# Patient Record
Sex: MALE | Race: WHITE | NOT HISPANIC OR LATINO | Employment: OTHER | URBAN - METROPOLITAN AREA
[De-identification: names, ages, dates, MRNs, and addresses within clinical notes are randomized per-mention and may not be internally consistent; named-entity substitution may affect disease eponyms.]

---

## 2023-10-27 DIAGNOSIS — G43.009 MIGRAINE WITHOUT AURA AND WITHOUT STATUS MIGRAINOSUS, NOT INTRACTABLE: Primary | ICD-10-CM

## 2023-10-27 PROBLEM — G43.909 MIGRAINE: Status: ACTIVE | Noted: 2023-10-27

## 2023-10-27 RX ORDER — SUMATRIPTAN SUCCINATE 100 MG/1
100 TABLET ORAL AS NEEDED
COMMUNITY
End: 2023-11-02 | Stop reason: SDUPTHER

## 2023-10-27 RX ORDER — NORTRIPTYLINE HYDROCHLORIDE 10 MG/1
40 CAPSULE ORAL NIGHTLY
COMMUNITY
End: 2023-10-27 | Stop reason: SDUPTHER

## 2023-10-27 RX ORDER — NORTRIPTYLINE HYDROCHLORIDE 10 MG/1
40 CAPSULE ORAL NIGHTLY
Qty: 30 CAPSULE | Refills: 0 | Status: SHIPPED | OUTPATIENT
Start: 2023-10-27 | End: 2023-11-15 | Stop reason: SDUPTHER

## 2023-10-28 PROBLEM — M75.00 FROZEN SHOULDER SYNDROME: Status: ACTIVE | Noted: 2023-10-28

## 2023-10-28 PROBLEM — R11.10 VOMITING: Status: ACTIVE | Noted: 2023-10-28

## 2023-10-28 PROBLEM — K27.9 PEPTIC ULCER DISEASE: Status: ACTIVE | Noted: 2023-10-28

## 2023-10-28 PROBLEM — R51.9 HEADACHE: Status: ACTIVE | Noted: 2023-10-28

## 2023-10-28 PROBLEM — K52.9 CHRONIC DIARRHEA OF UNKNOWN ORIGIN: Status: ACTIVE | Noted: 2023-10-28

## 2023-10-28 PROBLEM — F17.200 NICOTINE DEPENDENCE: Status: ACTIVE | Noted: 2023-10-28

## 2023-10-28 PROBLEM — K31.1: Status: ACTIVE | Noted: 2023-10-28

## 2023-10-28 PROBLEM — K63.89 BACTERIAL OVERGROWTH SYNDROME: Status: ACTIVE | Noted: 2023-10-28

## 2023-10-28 RX ORDER — HYDROCODONE BITARTRATE AND ACETAMINOPHEN 5; 325 MG/1; MG/1
1 TABLET ORAL EVERY 6 HOURS PRN
COMMUNITY
Start: 2023-09-25 | End: 2023-10-30 | Stop reason: ALTCHOICE

## 2023-10-28 RX ORDER — CIPROFLOXACIN 250 MG/1
250 TABLET, FILM COATED ORAL EVERY 12 HOURS
COMMUNITY
End: 2023-10-30 | Stop reason: SDUPTHER

## 2023-10-28 RX ORDER — OMEPRAZOLE 40 MG/1
40 CAPSULE, DELAYED RELEASE ORAL 2 TIMES DAILY
COMMUNITY
End: 2023-10-30 | Stop reason: DRUGHIGH

## 2023-10-28 RX ORDER — FERROUS SULFATE 325(65) MG
TABLET ORAL
COMMUNITY
Start: 2021-05-30

## 2023-10-28 RX ORDER — DIHYDROERGOTAMINE MESYLATE 4 MG/ML
SPRAY, METERED NASAL
COMMUNITY
Start: 2022-01-25 | End: 2023-10-30 | Stop reason: ALTCHOICE

## 2023-10-28 RX ORDER — LEVOFLOXACIN 250 MG/1
1 TABLET ORAL DAILY
COMMUNITY
Start: 2021-12-08

## 2023-10-28 RX ORDER — PANCRELIPASE 60000; 12000; 38000 [USP'U]/1; [USP'U]/1; [USP'U]/1
3 CAPSULE, DELAYED RELEASE PELLETS ORAL 3 TIMES DAILY
COMMUNITY

## 2023-10-28 RX ORDER — CEPHALEXIN 250 MG/1
250 CAPSULE ORAL 2 TIMES DAILY
COMMUNITY
End: 2023-10-30 | Stop reason: SDUPTHER

## 2023-10-28 RX ORDER — METRONIDAZOLE 250 MG/1
1 TABLET ORAL 3 TIMES DAILY
COMMUNITY
Start: 2018-07-24 | End: 2023-10-30 | Stop reason: ALTCHOICE

## 2023-10-30 ENCOUNTER — OFFICE VISIT (OUTPATIENT)
Dept: GASTROENTEROLOGY | Facility: CLINIC | Age: 53
End: 2023-10-30
Payer: COMMERCIAL

## 2023-10-30 VITALS
TEMPERATURE: 98.2 F | HEART RATE: 81 BPM | WEIGHT: 178 LBS | HEIGHT: 70 IN | BODY MASS INDEX: 25.48 KG/M2 | SYSTOLIC BLOOD PRESSURE: 136 MMHG | DIASTOLIC BLOOD PRESSURE: 90 MMHG

## 2023-10-30 DIAGNOSIS — K63.89 BACTERIAL OVERGROWTH SYNDROME: Primary | ICD-10-CM

## 2023-10-30 DIAGNOSIS — K27.9 PEPTIC ULCER DISEASE: ICD-10-CM

## 2023-10-30 PROCEDURE — 1036F TOBACCO NON-USER: CPT | Performed by: INTERNAL MEDICINE

## 2023-10-30 PROCEDURE — 99213 OFFICE O/P EST LOW 20 MIN: CPT | Performed by: INTERNAL MEDICINE

## 2023-10-30 RX ORDER — CEPHALEXIN 250 MG/1
250 CAPSULE ORAL 2 TIMES DAILY
Qty: 30 CAPSULE | Refills: 3 | Status: SHIPPED | OUTPATIENT
Start: 2023-10-30

## 2023-10-30 RX ORDER — CIPROFLOXACIN 250 MG/1
250 TABLET, FILM COATED ORAL EVERY 12 HOURS
Qty: 30 TABLET | Refills: 3 | Status: SHIPPED | OUTPATIENT
Start: 2023-10-30

## 2023-10-30 RX ORDER — OMEPRAZOLE 40 MG/1
40 CAPSULE, DELAYED RELEASE ORAL
Qty: 30 CAPSULE | Refills: 11 | Status: SHIPPED | OUTPATIENT
Start: 2023-10-30

## 2023-10-30 ASSESSMENT — ENCOUNTER SYMPTOMS
ABDOMINAL PAIN: 0
HEMATOLOGIC/LYMPHATIC NEGATIVE: 1
MUSCULOSKELETAL NEGATIVE: 1
ALLERGIC/IMMUNOLOGIC NEGATIVE: 1
BLOOD IN STOOL: 0
NEUROLOGICAL NEGATIVE: 1
VOMITING: 1
EYES NEGATIVE: 1
NAUSEA: 1
RESPIRATORY NEGATIVE: 1
CARDIOVASCULAR NEGATIVE: 1
CONSTITUTIONAL NEGATIVE: 1
CONSTIPATION: 0
PSYCHIATRIC NEGATIVE: 1
ENDOCRINE NEGATIVE: 1
DIARRHEA: 1

## 2023-10-30 NOTE — PROGRESS NOTES
"Subjective   Patient ID: Michael Maier is a 53 y.o. male who presents for Follow-up (GI issues).    Patient reports that he is feeling much better and endorses a 5-10lb weight gain. Endorses 1 episode of nausea/vomiting per month but only occurs when he does not stick to his scheduled eating schedule. Usually eats multiple small meals per day. Denies any episodes of diarrhea with the antibiotics but has been out of his prescription for 1 month and has had 2-3 episodes of diarrhea with this. While on the antibiotics, has 1 BM every 3 days that is soft, solid and without blood. Denies any abdominal pain. Reports a sensation of a lump in his throat/tightness but the burning sensation with food has resolved. He would like to decrease his omeprazole.    Review of Systems   Constitutional: Negative.    HENT: Negative.     Eyes: Negative.    Respiratory: Negative.     Cardiovascular: Negative.    Gastrointestinal:  Positive for diarrhea, nausea and vomiting. Negative for abdominal pain, blood in stool and constipation.   Endocrine: Negative.    Genitourinary: Negative.    Musculoskeletal: Negative.    Allergic/Immunologic: Negative.    Neurological: Negative.    Hematological: Negative.    Psychiatric/Behavioral: Negative.            Objective   Visit Vitals  /90 (BP Location: Left arm, Patient Position: Sitting, BP Cuff Size: Adult)   Pulse 81   Temp 36.8 °C (98.2 °F)   Ht 1.778 m (5' 10\")   Wt 80.7 kg (178 lb)   BMI 25.54 kg/m²   Smoking Status Former   BSA 2 m²     Physical Exam  Constitutional:       Appearance: Normal appearance.   HENT:      Head: Normocephalic and atraumatic.      Mouth/Throat:      Mouth: Mucous membranes are moist.   Eyes:      Extraocular Movements: Extraocular movements intact.      Conjunctiva/sclera: Conjunctivae normal.      Pupils: Pupils are equal, round, and reactive to light.   Cardiovascular:      Rate and Rhythm: Normal rate and regular rhythm.      Heart sounds: Normal " heart sounds.   Pulmonary:      Effort: Pulmonary effort is normal.      Breath sounds: Normal breath sounds.   Abdominal:      General: Abdomen is flat. Bowel sounds are normal.      Palpations: Abdomen is soft.      Tenderness: There is no abdominal tenderness.   Musculoskeletal:         General: Normal range of motion.      Cervical back: Normal range of motion and neck supple.   Skin:     General: Skin is warm and dry.   Neurological:      General: No focal deficit present.      Mental Status: He is alert and oriented to person, place, and time.   Psychiatric:         Mood and Affect: Mood normal.         Radiology: Reviewed imaging in powerchart.  No results found.      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    #Bacterial Overgrowth Syndrome   #Blind Loop Syndrome s/p Whipple  -continue the cephalexin/ciprofloxacin alternating course (continuously for 2 weeks each)    #GERD  -decrease the omeprazole 40mg BID to once daily    #Health Maintenance  -up to date on his colonoscopy- last performed 06/27/2022, repeat in 10 years    Staffed with the attending physician Dr. Abreu.      Priyanka Mejía, PGY-1  [facility]/ Internal Medicine    I saw and evaluated the patient. I personally obtained the key and critical portions of the history and physical exam or was physically present for key and critical portions performed by the trainee. I agree with the trainee's medical decision making as described in the  trainee's note.    Gurpreet Abreu MD

## 2023-10-30 NOTE — PATIENT INSTRUCTIONS
You are doing really well. I refilled your antibiotics. You can cut down to once a day on the omeprazole. Continue with Creon. Follow up in 6 months.

## 2023-11-02 DIAGNOSIS — G43.009 MIGRAINE WITHOUT AURA AND WITHOUT STATUS MIGRAINOSUS, NOT INTRACTABLE: Primary | ICD-10-CM

## 2023-11-02 RX ORDER — SUMATRIPTAN SUCCINATE 100 MG/1
100 TABLET ORAL AS NEEDED
Qty: 9 TABLET | Refills: 1 | Status: SHIPPED | OUTPATIENT
Start: 2023-11-02 | End: 2023-11-15 | Stop reason: SDUPTHER

## 2023-11-15 ENCOUNTER — TELEMEDICINE (OUTPATIENT)
Dept: NEUROLOGY | Facility: CLINIC | Age: 53
End: 2023-11-15
Payer: COMMERCIAL

## 2023-11-15 DIAGNOSIS — G43.009 MIGRAINE WITHOUT AURA AND WITHOUT STATUS MIGRAINOSUS, NOT INTRACTABLE: Primary | ICD-10-CM

## 2023-11-15 PROCEDURE — 99214 OFFICE O/P EST MOD 30 MIN: CPT | Performed by: NURSE PRACTITIONER

## 2023-11-15 RX ORDER — NORTRIPTYLINE HYDROCHLORIDE 10 MG/1
40 CAPSULE ORAL NIGHTLY
Qty: 120 CAPSULE | Refills: 5 | Status: SHIPPED | OUTPATIENT
Start: 2023-11-15 | End: 2024-05-10 | Stop reason: SDUPTHER

## 2023-11-15 RX ORDER — SUMATRIPTAN SUCCINATE 100 MG/1
100 TABLET ORAL AS NEEDED
Qty: 9 TABLET | Refills: 5 | Status: SHIPPED | OUTPATIENT
Start: 2023-11-15 | End: 2024-05-22 | Stop reason: SDUPTHER

## 2023-11-15 NOTE — PROGRESS NOTES
Patient being assessed today for follow-up of migraine.  He reports that since adjusting the nortriptyline dose that he has had significant improvement in the duration of his migraines.  They were lasting 3 to 4 days prior to the change and now they are back down to normal 1 or 2.  He does utilize the sumatriptan as necessary for breakthrough migraines.  This is effective for him.  Denies any side effects.  Would like to continue the nortriptyline and sumatriptan as he has been taking them.  Discussed role of medicine, importance of taking medications, potential risks, benefits, and precautions to be taken.  Reviewed sleep hygiene and dietary modifications.  Follow-up in 6 months.    This note was created with voice recognition software and was not corrected for typographical or grammatical errors

## 2024-01-31 ENCOUNTER — TELEPHONE (OUTPATIENT)
Dept: SURGERY | Facility: CLINIC | Age: 54
End: 2024-01-31
Payer: COMMERCIAL

## 2024-04-29 ENCOUNTER — APPOINTMENT (OUTPATIENT)
Dept: GASTROENTEROLOGY | Facility: CLINIC | Age: 54
End: 2024-04-29
Payer: COMMERCIAL

## 2024-05-10 DIAGNOSIS — G43.009 MIGRAINE WITHOUT AURA AND WITHOUT STATUS MIGRAINOSUS, NOT INTRACTABLE: ICD-10-CM

## 2024-05-10 RX ORDER — NORTRIPTYLINE HYDROCHLORIDE 10 MG/1
40 CAPSULE ORAL NIGHTLY
Qty: 30 CAPSULE | Refills: 0 | Status: SHIPPED | OUTPATIENT
Start: 2024-05-10 | End: 2024-05-22 | Stop reason: SDUPTHER

## 2024-05-22 ENCOUNTER — TELEMEDICINE (OUTPATIENT)
Dept: NEUROLOGY | Facility: CLINIC | Age: 54
End: 2024-05-22
Payer: COMMERCIAL

## 2024-05-22 DIAGNOSIS — G43.009 MIGRAINE WITHOUT AURA AND WITHOUT STATUS MIGRAINOSUS, NOT INTRACTABLE: Primary | ICD-10-CM

## 2024-05-22 PROCEDURE — 99214 OFFICE O/P EST MOD 30 MIN: CPT | Performed by: NURSE PRACTITIONER

## 2024-05-22 RX ORDER — SUMATRIPTAN SUCCINATE 100 MG/1
100 TABLET ORAL AS NEEDED
Qty: 9 TABLET | Refills: 5 | Status: SHIPPED | OUTPATIENT
Start: 2024-05-22

## 2024-05-22 RX ORDER — NORTRIPTYLINE HYDROCHLORIDE 10 MG/1
40 CAPSULE ORAL NIGHTLY
Qty: 120 CAPSULE | Refills: 11 | Status: SHIPPED | OUTPATIENT
Start: 2024-05-22

## 2024-05-22 NOTE — PROGRESS NOTES
Airway    Date/Time: 11/3/2022 1:19 PM  Performed by: Yohana Monique M.D.  Authorized by: Yohana Monique M.D.     Location:  OR  Urgency:  Elective  Indications for Airway Management:  Anesthesia      Spontaneous Ventilation: absent    Sedation Level:  Deep  Preoxygenated: Yes    Mask Difficulty Assessment:  1 - vent by mask  Final Airway Type:  Supraglottic airway  Final Supraglottic Airway:  Standard LMA    SGA Size:  4  Number of Attempts at Approach:  1           Patient being assessed today for follow-up of migraine.  He reports that he is doing well on the nortriptyline.  He has on average of 6 breakthrough migraines per month.  He utilizes the sumatriptan which is typically effective with 1 dose.  Denies any side effects.  We will continue the nortriptyline and sumatriptan as he has been taking it.  Discussed role of medicine, importance of taking medications, potential risks, benefits, and precautions to be taken.  Reviewed sleep hygiene and dietary modifications.  Follow-up in 1 year or sooner if needed.    This note was created with voice recognition software and was not corrected for typographical or grammatical errors

## 2024-08-05 ENCOUNTER — APPOINTMENT (OUTPATIENT)
Dept: GASTROENTEROLOGY | Facility: CLINIC | Age: 54
End: 2024-08-05
Payer: COMMERCIAL

## 2024-08-26 DIAGNOSIS — G43.009 MIGRAINE WITHOUT AURA AND WITHOUT STATUS MIGRAINOSUS, NOT INTRACTABLE: ICD-10-CM

## 2024-08-26 RX ORDER — SUMATRIPTAN SUCCINATE 100 MG/1
100 TABLET ORAL AS NEEDED
Qty: 9 TABLET | Refills: 5 | Status: SHIPPED | OUTPATIENT
Start: 2024-08-26

## 2024-08-26 RX ORDER — NORTRIPTYLINE HYDROCHLORIDE 10 MG/1
40 CAPSULE ORAL NIGHTLY
Qty: 120 CAPSULE | Refills: 8 | Status: SHIPPED | OUTPATIENT
Start: 2024-08-26

## 2024-08-29 ENCOUNTER — APPOINTMENT (OUTPATIENT)
Dept: NEUROLOGY | Facility: CLINIC | Age: 54
End: 2024-08-29
Payer: COMMERCIAL

## 2024-10-22 ENCOUNTER — OFFICE VISIT (OUTPATIENT)
Dept: GASTROENTEROLOGY | Facility: HOSPITAL | Age: 54
End: 2024-10-22
Payer: COMMERCIAL

## 2024-10-22 ENCOUNTER — LAB (OUTPATIENT)
Dept: LAB | Facility: LAB | Age: 54
End: 2024-10-22
Payer: COMMERCIAL

## 2024-10-22 VITALS
DIASTOLIC BLOOD PRESSURE: 86 MMHG | HEART RATE: 88 BPM | TEMPERATURE: 98.4 F | WEIGHT: 169.6 LBS | SYSTOLIC BLOOD PRESSURE: 132 MMHG | RESPIRATION RATE: 20 BRPM | BODY MASS INDEX: 24.34 KG/M2

## 2024-10-22 DIAGNOSIS — K27.9 PEPTIC ULCER DISEASE: ICD-10-CM

## 2024-10-22 DIAGNOSIS — K63.89 BACTERIAL OVERGROWTH SYNDROME: ICD-10-CM

## 2024-10-22 LAB
ALBUMIN SERPL BCP-MCNC: 4.3 G/DL (ref 3.4–5)
ALP SERPL-CCNC: 78 U/L (ref 33–120)
ALT SERPL W P-5'-P-CCNC: 41 U/L (ref 10–52)
ANION GAP SERPL CALC-SCNC: 14 MMOL/L (ref 10–20)
AST SERPL W P-5'-P-CCNC: 42 U/L (ref 9–39)
BILIRUB SERPL-MCNC: 0.4 MG/DL (ref 0–1.2)
BUN SERPL-MCNC: 16 MG/DL (ref 6–23)
CALCIUM SERPL-MCNC: 9.4 MG/DL (ref 8.6–10.6)
CHLORIDE SERPL-SCNC: 104 MMOL/L (ref 98–107)
CO2 SERPL-SCNC: 24 MMOL/L (ref 21–32)
CREAT SERPL-MCNC: 0.98 MG/DL (ref 0.5–1.3)
EGFRCR SERPLBLD CKD-EPI 2021: >90 ML/MIN/1.73M*2
ERYTHROCYTE [DISTWIDTH] IN BLOOD BY AUTOMATED COUNT: 13.3 % (ref 11.5–14.5)
GLUCOSE SERPL-MCNC: 91 MG/DL (ref 74–99)
HCT VFR BLD AUTO: 43.2 % (ref 41–52)
HGB BLD-MCNC: 13.9 G/DL (ref 13.5–17.5)
MCH RBC QN AUTO: 30.6 PG (ref 26–34)
MCHC RBC AUTO-ENTMCNC: 32.2 G/DL (ref 32–36)
MCV RBC AUTO: 95 FL (ref 80–100)
NRBC BLD-RTO: 0 /100 WBCS (ref 0–0)
PLATELET # BLD AUTO: 430 X10*3/UL (ref 150–450)
POTASSIUM SERPL-SCNC: 4.2 MMOL/L (ref 3.5–5.3)
PROT SERPL-MCNC: 7.1 G/DL (ref 6.4–8.2)
RBC # BLD AUTO: 4.54 X10*6/UL (ref 4.5–5.9)
SODIUM SERPL-SCNC: 138 MMOL/L (ref 136–145)
WBC # BLD AUTO: 7.3 X10*3/UL (ref 4.4–11.3)

## 2024-10-22 PROCEDURE — 99213 OFFICE O/P EST LOW 20 MIN: CPT | Performed by: INTERNAL MEDICINE

## 2024-10-22 PROCEDURE — 36415 COLL VENOUS BLD VENIPUNCTURE: CPT

## 2024-10-22 PROCEDURE — 85027 COMPLETE CBC AUTOMATED: CPT

## 2024-10-22 PROCEDURE — 80053 COMPREHEN METABOLIC PANEL: CPT

## 2024-10-22 RX ORDER — CEPHALEXIN 250 MG/1
250 CAPSULE ORAL 2 TIMES DAILY
Qty: 30 CAPSULE | Refills: 3 | Status: SHIPPED | OUTPATIENT
Start: 2024-10-22

## 2024-10-22 RX ORDER — FERROUS SULFATE 325(65) MG
1 TABLET ORAL DAILY
Qty: 90 TABLET | Refills: 3 | Status: SHIPPED | OUTPATIENT
Start: 2024-10-22 | End: 2025-10-22

## 2024-10-22 RX ORDER — CIPROFLOXACIN 250 MG/1
250 TABLET, FILM COATED ORAL EVERY 12 HOURS
Qty: 30 TABLET | Refills: 3 | Status: SHIPPED | OUTPATIENT
Start: 2024-10-22

## 2024-10-22 RX ORDER — OMEPRAZOLE 40 MG/1
40 CAPSULE, DELAYED RELEASE ORAL
Qty: 30 CAPSULE | Refills: 11 | Status: SHIPPED | OUTPATIENT
Start: 2024-10-22

## 2024-10-22 ASSESSMENT — PAIN SCALES - GENERAL: PAINLEVEL_OUTOF10: 0-NO PAIN

## 2024-10-22 NOTE — PATIENT INSTRUCTIONS
Glad that you are doing well. I refilled your medications and ordered routine labs. If everything is in order you should follow up in 6 months.

## 2024-10-22 NOTE — PROGRESS NOTES
History Of Present Illness  Michael Maier is a 54 y.o. male presenting with vomiting.    He has a history of a Whipple many years ago for benign disease which was complicated by gastric outlet obstruction and resulted in revision to a Ruthie-en-Y.  He has had problems in the past with vomiting but this has been under control over the past few years with only few episodes per year.  He has also had SIBO and is on rotating antibiotic therapy chronically.  He had lost a significant amount of weight early on but it has increased and is now stable.  His appetite is good.  Bowel movements have been regular.  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  He has not had any blood work for 3 years.         Past Medical History  Past Medical History:   Diagnosis Date    Other conditions influencing health status 08/25/2013    Digestive System Complications    Personal history of other diseases of the digestive system     Personal history of gallstones       Surgical History  Past Surgical History:   Procedure Laterality Date    OTHER SURGICAL HISTORY  02/24/2014    Partial Gastrectomy Billroth II    OTHER SURGICAL HISTORY  02/24/2014    Proximal Subtotal Pancreatectomy (Whipple Procedure)        Social History  He reports that he has quit smoking. His smoking use included cigarettes. He has never used smokeless tobacco. He reports that he does not currently use alcohol. He reports that he does not use drugs.    Family History  Family History   Problem Relation Name Age of Onset    No Known Problems Mother      No Known Problems Father          Allergies  Patient has no known allergies.    Last Recorded Vitals  /86   Pulse 88   Temp 36.9 °C (98.4 °F)   Resp 20   Wt 76.9 kg (169 lb 9.6 oz)   BMI 24.34 kg/m²        Physical Exam  Vitals reviewed.   Constitutional:       Appearance: Normal appearance.   Cardiovascular:      Rate and Rhythm: Normal rate and regular rhythm.   Pulmonary:      Effort:  Pulmonary effort is normal.      Breath sounds: Normal breath sounds.   Abdominal:      General: Bowel sounds are normal. There is no distension.      Palpations: Abdomen is soft.      Tenderness: There is no abdominal tenderness.   Neurological:      Mental Status: He is alert.           Labs  Lab Results   Component Value Date    GLUCOSE 94 08/14/2021    CALCIUM 9.4 08/14/2021     08/14/2021    K 4.4 08/14/2021    CO2 29 08/14/2021     08/14/2021    BUN 17 08/14/2021    CREATININE 0.92 08/14/2021     Lab Results   Component Value Date    WBC 7.4 08/14/2021    HGB 14.2 08/14/2021    HCT 43.1 08/14/2021    MCV 97 08/14/2021     08/14/2021     08/14/2021    K 4.4 08/14/2021     08/14/2021    CO2 29 08/14/2021    BUN 17 08/14/2021    CREATININE 0.92 08/14/2021    CALCIUM 9.4 08/14/2021    PROT 7.1 08/14/2021    BILITOT 0.3 08/14/2021    ALKPHOS 73 08/14/2021    ALT 27 08/14/2021    AST 25 08/14/2021    GLUCOSE 94 08/14/2021           Imaging     No results found.  Colonoscopy  Patient Name: Michael Maier  Procedure Date: 6/27/2022 10:59 AM  MRN: [id number]  Account Number: [id number]  YOB: 1970  Site: [facility]  Ethnicity: Not  or   Race: White  Attending MD: Gurpreet Abreu MD, [id number]  Procedure:             Colonoscopy  Indications:           Screening for colorectal malignant neoplasm  Patient Profile:       This is a 52 year old male. Refer to note in patient                          chart for documentation of history and physical.  Providers:             Gurpreet Abreu MD (Doctor), Ruth Salinas MD                          (Fellow)  Referring MD:          Myrna Cho  Medicines:             Monitored Anesthesia Care  Complications:         No immediate complications.  Procedure:             Pre-Anesthesia Assessment:                         - Prior to the procedure, a History and Physical was                           performed, and patient medications and allergies were                          reviewed. The patient is competent. The risks and                          benefits of the procedure and the sedation options and                          risks were discussed with the patient. All questions                          were answered and informed consent was obtained.                          Patient identification and proposed procedure were                          verified by the physician in the pre-procedure area.                          Mental Status Examination: alert and oriented. Airway                          Examination: normal oropharyngeal airway and neck                          mobility. Respiratory Examination: clear to                          auscultation. CV Examination: normal. Prophylactic                          Antibiotics: The patient does not require prophylactic                          antibiotics. Prior Anticoagulants: The patient has                          taken no anticoagulant or antiplatelet agents. ASA                          Grade Assessment: II - A patient with mild systemic                          disease. After reviewing the risks and benefits, the                          patient was deemed in satisfactory condition to                          undergo the procedure. The anesthesia plan was to use                          monitored anesthesia care (MAC). Immediately prior to                          administration of medications, the patient was                          re-assessed for adequacy to receive sedatives. The                          heart rate, respiratory rate, oxygen saturations,                          blood pressure, adequacy of pulmonary ventilation, and                          response to care were monitored throughout the                          procedure. The physical status of the patient was                          re-assessed after the procedure.                          After I obtained informed consent, the scope was                          passed under direct vision. Throughout the procedure,                          the patient's blood pressure, pulse, and oxygen                          saturations were monitored continuously. The                          Colonoscope was introduced through the anus and                          advanced to the cecum, identified by appendiceal                          orifice and ileocecal valve. The colonoscopy was                          performed without difficulty. The patient tolerated                          the procedure well. The quality of the bowel                          preparation was evaluated using the BBPS ([place] Bowel                          Preparation Scale) with scores of: Right Colon = 2                          (minor amount of residual staining, small fragments of                          stool and/or opaque liquid, but mucosa seen well),                          Transverse Colon = 2 (minor amount of residual                          staining, small fragments of stool and/or opaque                          liquid, but mucosa seen well) and Left Colon = 3                          (entire mucosa seen well with no residual staining,                          small fragments of stool or opaque liquid). The total                          BBPS score equals 7. The quality of the bowel                          preparation was good. The ileocecal valve, appendiceal                          orifice, and rectum were photographed.  Findings:       The perianal and digital rectal examinations were normal.       Non-bleeding external and internal hemorrhoids were found during        retroflexion. The hemorrhoids were small.       The exam was otherwise without abnormality.  Moderate Sedation:       N/A  Estimated Blood Loss:       Estimated blood loss: none.  Impression:            - Non-bleeding external and internal  hemorrhoids.                         - The examination was otherwise normal.                         - No specimens collected.  Recommendation:        - Patient has a contact number available for                          emergencies. The signs and symptoms of potential                          delayed complications were discussed with the patient.                          Return to normal activities tomorrow. Written                          discharge instructions were provided to the patient.                         - Resume previous diet today.                         - Continue present medications.                         - The patient is not currently taking anticoagulant or                          antiplatelet agents.                         - Repeat colonoscopy in 10 years for screening                          purposes.  Procedure Code(s):     --- Professional ---                         75697, Colonoscopy, flexible; diagnostic, including                          collection of specimen(s) by brushing or washing, when                          performed (separate procedure)  Diagnosis Code(s):     --- Professional ---                         Z12.11, Encounter for screening for malignant neoplasm                          of colon                         K64.8, Other hemorrhoids  CPT copyright 2022 [employer]. All rights reserved.  The codes documented in this report are preliminary and upon  review may   be revised to meet current compliance requirements.  Attending Participation:       I was present and participated during the entire procedure, including        non-key portions.  Gurpreet Abreu MD  6/27/2022 11:56:32 AM  This report has been signed electronically.  Ruth Salinas MD  Number of Addenda: 0  Note Initiated On: 6/27/2022 10:59 AM  Scope Withdrawal Time 0 hours 15 minutes 24 seconds   Total Procedure Duration Time 0 hours 24 minutes 50 seconds          ASSESSMENT &  PLAN  Problem List Items Addressed This Visit             ICD-10-CM    Bacterial overgrowth syndrome K63.89    Relevant Medications    cephalexin (Keflex) 250 mg capsule    ciprofloxacin (Cipro) 250 mg tablet    Other Relevant Orders    CBC    Comprehensive metabolic panel    Follow Up In Gastroenterology    Peptic ulcer disease K27.9    Relevant Medications    ferrous sulfate, 325 mg ferrous sulfate, tablet    omeprazole (PriLOSEC) 40 mg DR capsule    Other Relevant Orders    CBC    Comprehensive metabolic panel    Follow Up In Gastroenterology     He is doing well.  I ordered routine blood work and refilled his medications.  He should follow-up in 6 months time.    I spent 15 minutes in the professional and overall care of this patient.      Gurpreet Abreu MD

## 2024-11-04 ENCOUNTER — APPOINTMENT (OUTPATIENT)
Dept: NEUROLOGY | Facility: CLINIC | Age: 54
End: 2024-11-04
Payer: COMMERCIAL

## 2024-12-04 ENCOUNTER — TELEMEDICINE (OUTPATIENT)
Dept: NEUROLOGY | Facility: CLINIC | Age: 54
End: 2024-12-04
Payer: COMMERCIAL

## 2024-12-04 DIAGNOSIS — G43.009 MIGRAINE WITHOUT AURA AND WITHOUT STATUS MIGRAINOSUS, NOT INTRACTABLE: Primary | ICD-10-CM

## 2024-12-04 PROCEDURE — 99214 OFFICE O/P EST MOD 30 MIN: CPT | Mod: 95 | Performed by: NURSE PRACTITIONER

## 2024-12-04 PROCEDURE — 99214 OFFICE O/P EST MOD 30 MIN: CPT | Performed by: NURSE PRACTITIONER

## 2024-12-04 RX ORDER — NORTRIPTYLINE HYDROCHLORIDE 50 MG/1
50 CAPSULE ORAL NIGHTLY
Qty: 90 CAPSULE | Refills: 0 | Status: SHIPPED | OUTPATIENT
Start: 2024-12-04

## 2024-12-04 RX ORDER — SUMATRIPTAN SUCCINATE 100 MG/1
100 TABLET ORAL AS NEEDED
Qty: 9 TABLET | Refills: 5 | Status: SHIPPED | OUTPATIENT
Start: 2024-12-04

## 2024-12-04 NOTE — PROGRESS NOTES
Virtual or Telephone Consent    An interactive audio and video telecommunication system which permits real time communications between the patient (at the originating site) and provider (at the distant site) was utilized to provide this telehealth service.   Verbal consent was requested and obtained from Michael Maier on this date, 12/04/24 for a telehealth visit.     No chief complaint on file.      Subjective   HPI  Michael Maier is a 54 y.o. year old male who presents with chief complaint Migraine without aura and without status migrainosus, not intractable [G43.009]      Michael is having  9 HA days per month, 9 of the HAs meet migraine criteria.   The current rescue medications work within  2    hours and decreased the headache by 100%. The patient  does not repeat treatment with rescue medication.         Current/ past HA treatments:  Preventive:  Nortriptyline      Rescue:  Sumatriptan        Current Outpatient Medications:     cephalexin (Keflex) 250 mg capsule, Take 1 capsule (250 mg) by mouth 2 times a day., Disp: 30 capsule, Rfl: 3    ciprofloxacin (Cipro) 250 mg tablet, Take 1 tablet (250 mg) by mouth every 12 hours., Disp: 30 tablet, Rfl: 3    Creon 12,000-38,000 -60,000 unit capsule, Take 3 capsules by mouth 3 times a day., Disp: , Rfl:     ferrous sulfate, 325 mg ferrous sulfate, tablet, Take 1 tablet (325 mg) by mouth once daily. Take with food., Disp: 90 tablet, Rfl: 3    levoFLOXacin (Levaquin) 250 mg tablet, Take 1 tablet (250 mg) by mouth once daily. (Patient not taking: Reported on 10/22/2024), Disp: , Rfl:     omeprazole (PriLOSEC) 40 mg DR capsule, Take 1 capsule (40 mg) by mouth once daily in the morning. Take before meals., Disp: 30 capsule, Rfl: 11    SUMAtriptan (Imitrex) 100 mg tablet, Take 1 tablet (100 mg) by mouth if needed for migraine (At onset of migraine.  May repeat in 2 hours if needed.  Max 200 mg per 24 hours)., Disp: 9 tablet, Rfl: 5    No Known Allergies    Past  Medical History:   Diagnosis Date    Other conditions influencing health status 08/25/2013    Digestive System Complications    Personal history of other diseases of the digestive system     Personal history of gallstones     Past Surgical History:   Procedure Laterality Date    OTHER SURGICAL HISTORY  02/24/2014    Partial Gastrectomy Billroth II    OTHER SURGICAL HISTORY  02/24/2014    Proximal Subtotal Pancreatectomy (Whipple Procedure)     Family History   Problem Relation Name Age of Onset    No Known Problems Mother      No Known Problems Father       Social History     Tobacco Use    Smoking status: Former     Types: Cigarettes    Smokeless tobacco: Never   Substance Use Topics    Alcohol use: Not Currently     Social History     Substance and Sexual Activity   Drug Use Never        ROS  As noted in HPI, otherwise all other systems have been reviewed are negative for complaint.     Objective   General Appearance: Michael is well-developed, well-nourished, 54 y.o. year old male, in no acute distress. Makes good eye contact, is alert, interactive, and cooperative. Demonstrates recent & remote memory recall. Subjective information consistent with objective assessment.     There were no vitals filed for this visit.    Lab Results   Component Value Date    WBC 7.3 10/22/2024    RBC 4.54 10/22/2024    HGB 13.9 10/22/2024    HCT 43.2 10/22/2024     10/22/2024     10/22/2024    K 4.2 10/22/2024     10/22/2024    BUN 16 10/22/2024    CREATININE 0.98 10/22/2024    EGFR >90 10/22/2024    CALCIUM 9.4 10/22/2024    ALKPHOS 78 10/22/2024    AST 42 (H) 10/22/2024    ALT 41 10/22/2024    CHOL 207 (H) 08/14/2021    HDL 55.5 08/14/2021    TRIG 194 (H) 08/14/2021    TSH 1.50 08/14/2021          Assessment & Plan  Migraine without aura and without status migrainosus, not intractable              ASSESSMENT/PLAN:  Increase nortriptyline to 50 mg for preventative treatment.  Continue sumatriptan for abortive  treatment.  Notify office in 8 weeks of efficacy for the increase of nortriptyline.  Follow-up in 6 months or sooner if needed.      Stephanie Valenzuela, APRN-CNP

## 2025-03-03 DIAGNOSIS — G43.009 MIGRAINE WITHOUT AURA AND WITHOUT STATUS MIGRAINOSUS, NOT INTRACTABLE: ICD-10-CM

## 2025-03-05 RX ORDER — NORTRIPTYLINE HYDROCHLORIDE 50 MG/1
50 CAPSULE ORAL NIGHTLY
Qty: 90 CAPSULE | Refills: 0 | Status: SHIPPED | OUTPATIENT
Start: 2025-03-05

## 2025-04-09 ENCOUNTER — APPOINTMENT (OUTPATIENT)
Dept: GASTROENTEROLOGY | Facility: CLINIC | Age: 55
End: 2025-04-09
Payer: COMMERCIAL

## 2025-04-29 ENCOUNTER — APPOINTMENT (OUTPATIENT)
Dept: SLEEP MEDICINE | Facility: HOSPITAL | Age: 55
End: 2025-04-29
Payer: COMMERCIAL

## 2025-05-28 DIAGNOSIS — G43.009 MIGRAINE WITHOUT AURA AND WITHOUT STATUS MIGRAINOSUS, NOT INTRACTABLE: ICD-10-CM

## 2025-05-28 RX ORDER — SUMATRIPTAN SUCCINATE 100 MG/1
TABLET ORAL
Qty: 9 TABLET | Refills: 0 | Status: SHIPPED | OUTPATIENT
Start: 2025-05-28

## 2025-06-02 ENCOUNTER — TELEMEDICINE (OUTPATIENT)
Dept: NEUROLOGY | Facility: CLINIC | Age: 55
End: 2025-06-02
Payer: COMMERCIAL

## 2025-06-02 DIAGNOSIS — G43.009 MIGRAINE WITHOUT AURA AND WITHOUT STATUS MIGRAINOSUS, NOT INTRACTABLE: Primary | ICD-10-CM

## 2025-06-02 PROCEDURE — 99214 OFFICE O/P EST MOD 30 MIN: CPT | Performed by: NURSE PRACTITIONER

## 2025-06-02 RX ORDER — NORTRIPTYLINE HYDROCHLORIDE 75 MG/1
75 CAPSULE ORAL NIGHTLY
Qty: 90 CAPSULE | Refills: 0 | Status: SHIPPED | OUTPATIENT
Start: 2025-06-02 | End: 2025-06-02 | Stop reason: ENTERED-IN-ERROR

## 2025-06-02 RX ORDER — SUMATRIPTAN SUCCINATE 100 MG/1
100 TABLET ORAL AS NEEDED
Qty: 9 TABLET | Refills: 5 | Status: SHIPPED | OUTPATIENT
Start: 2025-06-02

## 2025-06-02 RX ORDER — NORTRIPTYLINE HYDROCHLORIDE 75 MG/1
75 CAPSULE ORAL NIGHTLY
Qty: 90 CAPSULE | Refills: 1 | Status: SHIPPED | OUTPATIENT
Start: 2025-06-02

## 2025-06-02 NOTE — PROGRESS NOTES
Virtual or Telephone Consent    An interactive audio and video telecommunication system which permits real time communications between the patient (at the originating site) and provider (at the distant site) was utilized to provide this telehealth service.   Verbal consent was requested and obtained from Michael Maier on this date, 06/02/25 for a telehealth visit and the patient's location was confirmed at the time of the visit.      Subjective   HPI  Michael Maier is a 54 y.o. year old male who presents with chief complaint Migraine without aura and without status migrainosus, not intractable [G43.009]    Michael is experiencing  8-9 HA days per month, 8-9 of the HAs meet migraine criteria.   The headaches are usually moderate and pounding and are located across forehead, generally symmetric.   Generally, headaches last about 2 hours in duration.  With sumatriptan.  Associated nausea and photophobia.   Headaches are worsened with exertion.   The current rescue medications work within  2    hours and decreased the headache by 100%.   Caffeine:  few cups of coffee per day  Sleep:  5-6 hours per night  The patient denies the presence of any associated double vision, speech problems, confusion, facial or extremity weakness or numbness or problems with coordination. Denies other neurological history of seizures, stroke, or TIA.        Current/ past HA treatments:  Preventive:  Nortriptyline    Rescue:  Acetaminophen  Ibuprofen  Sumatriptan    Current Medications[1]    RX Allergies[2]    Medical History[3]  Surgical History[4]  Family History[5]  Social History     Tobacco Use    Smoking status: Former     Types: Cigarettes    Smokeless tobacco: Never   Substance Use Topics    Alcohol use: Not Currently     Social History     Substance and Sexual Activity   Drug Use Never        ROS  As noted in HPI, otherwise all other systems have been reviewed are negative for complaint.     Objective   General Appearance:  Michael is well-developed, well-nourished, 54 y.o. year old male, in no acute distress. Makes good eye contact, is alert, interactive, and cooperative. Demonstrates recent & remote memory recall. Subjective information consistent with objective assessment.       Lab Results   Component Value Date    WBC 7.3 10/22/2024    RBC 4.54 10/22/2024    HGB 13.9 10/22/2024    HCT 43.2 10/22/2024     10/22/2024     10/22/2024    K 4.2 10/22/2024     10/22/2024    BUN 16 10/22/2024    CREATININE 0.98 10/22/2024    EGFR >90 10/22/2024    CALCIUM 9.4 10/22/2024    ALKPHOS 78 10/22/2024    AST 42 (H) 10/22/2024    ALT 41 10/22/2024    CHOL 207 (H) 08/14/2021    HDL 55.5 08/14/2021    TRIG 194 (H) 08/14/2021    TSH 1.50 08/14/2021        Neurological Exam  Cranial Nerves  CN VII: Full and symmetric facial movement.  CN VIII: Hearing is normal.    RECORDS REVIEW:  Previous records (provider notes, laboratory reports, and imaging studies were reviewed and summarized).    Assessment & Plan  Migraine without aura and without status migrainosus, not intractable    Orders:    nortriptyline (Pamelor) 75 mg capsule; Take 1 capsule (75 mg) by mouth once daily at bedtime.    SUMAtriptan (Imitrex) 100 mg tablet; Take 1 tablet (100 mg) by mouth if needed for migraine. Okay to repeat x 1 dose in 2 hours if needed.  Max 200 mg per 24 hours         ASSESSMENT/PLAN:  Increase nortriptyline to 75 mg p.o. nightly for preventative treatment of migraine.  Continue sumatriptan 100 mg as needed for abortive treatment of migraine.  Patient to notify office in 6 to 8 weeks of efficacy.  Follow-up in 6 months or sooner if needed.        Stephanie Valenzuela, APRN-CNP         [1]   Current Outpatient Medications:     cephalexin (Keflex) 250 mg capsule, Take 1 capsule (250 mg) by mouth 2 times a day., Disp: 30 capsule, Rfl: 3    ciprofloxacin (Cipro) 250 mg tablet, Take 1 tablet (250 mg) by mouth every 12 hours., Disp: 30 tablet, Rfl: 3    Creon  12,000-38,000 -60,000 unit capsule, Take 3 capsules by mouth 3 times a day., Disp: , Rfl:     ferrous sulfate, 325 mg ferrous sulfate, tablet, Take 1 tablet (325 mg) by mouth once daily. Take with food., Disp: 90 tablet, Rfl: 3    levoFLOXacin (Levaquin) 250 mg tablet, Take 1 tablet (250 mg) by mouth once daily. (Patient not taking: Reported on 10/22/2024), Disp: , Rfl:     nortriptyline (Pamelor) 50 mg capsule, TAKE 1 CAPSULE (50 MG) BY MOUTH ONCE DAILY AT BEDTIME., Disp: 90 capsule, Rfl: 0    omeprazole (PriLOSEC) 40 mg DR capsule, Take 1 capsule (40 mg) by mouth once daily in the morning. Take before meals., Disp: 30 capsule, Rfl: 11    SUMAtriptan (Imitrex) 100 mg tablet, TAKE 1 TABLET AS NEEDED AT ONSET OF MIGRAINE. MAY REPEAT IN 2 HRS IF NEEDED. MAX 2TABS/24 HOURS., Disp: 9 tablet, Rfl: 0  [2] No Known Allergies  [3]   Past Medical History:  Diagnosis Date    Other conditions influencing health status 08/25/2013    Digestive System Complications    Personal history of other diseases of the digestive system     Personal history of gallstones   [4]   Past Surgical History:  Procedure Laterality Date    OTHER SURGICAL HISTORY  02/24/2014    Partial Gastrectomy Billroth II    OTHER SURGICAL HISTORY  02/24/2014    Proximal Subtotal Pancreatectomy (Whipple Procedure)   [5]   Family History  Problem Relation Name Age of Onset    No Known Problems Mother      No Known Problems Father

## 2025-06-02 NOTE — ASSESSMENT & PLAN NOTE
Orders:    nortriptyline (Pamelor) 75 mg capsule; Take 1 capsule (75 mg) by mouth once daily at bedtime.    SUMAtriptan (Imitrex) 100 mg tablet; Take 1 tablet (100 mg) by mouth if needed for migraine. Okay to repeat x 1 dose in 2 hours if needed.  Max 200 mg per 24 hours

## 2025-06-30 ENCOUNTER — OFFICE VISIT (OUTPATIENT)
Dept: GASTROENTEROLOGY | Facility: CLINIC | Age: 55
End: 2025-06-30
Payer: COMMERCIAL

## 2025-06-30 VITALS
SYSTOLIC BLOOD PRESSURE: 112 MMHG | DIASTOLIC BLOOD PRESSURE: 77 MMHG | WEIGHT: 169 LBS | TEMPERATURE: 97.7 F | HEART RATE: 84 BPM | HEIGHT: 70 IN | BODY MASS INDEX: 24.2 KG/M2

## 2025-06-30 DIAGNOSIS — K63.89 BACTERIAL OVERGROWTH SYNDROME: Primary | ICD-10-CM

## 2025-06-30 DIAGNOSIS — K86.1 IDIOPATHIC CHRONIC PANCREATITIS (MULTI): ICD-10-CM

## 2025-06-30 DIAGNOSIS — K27.9 PEPTIC ULCER DISEASE: ICD-10-CM

## 2025-06-30 PROCEDURE — 3008F BODY MASS INDEX DOCD: CPT | Performed by: INTERNAL MEDICINE

## 2025-06-30 PROCEDURE — 99212 OFFICE O/P EST SF 10 MIN: CPT | Performed by: INTERNAL MEDICINE

## 2025-06-30 PROCEDURE — 99213 OFFICE O/P EST LOW 20 MIN: CPT | Performed by: INTERNAL MEDICINE

## 2025-06-30 PROCEDURE — 1036F TOBACCO NON-USER: CPT | Performed by: INTERNAL MEDICINE

## 2025-06-30 RX ORDER — METRONIDAZOLE 250 MG/1
250 TABLET ORAL 3 TIMES DAILY
Qty: 42 TABLET | Refills: 5 | Status: SHIPPED | OUTPATIENT
Start: 2025-06-30 | End: 2025-07-14

## 2025-06-30 RX ORDER — PANCRELIPASE 60000; 12000; 38000 [USP'U]/1; [USP'U]/1; [USP'U]/1
3 CAPSULE, DELAYED RELEASE PELLETS ORAL 3 TIMES DAILY
Qty: 270 CAPSULE | Refills: 11 | Status: SHIPPED | OUTPATIENT
Start: 2025-06-30 | End: 2026-06-30

## 2025-06-30 ASSESSMENT — PAIN SCALES - GENERAL: PAINLEVEL_OUTOF10: 0-NO PAIN

## 2025-06-30 NOTE — PROGRESS NOTES
"History Of Present Illness  Michael Maier is a 55 y.o. male presenting with vomiting.    He has a history of a Whipple many years ago for benign disease which was complicated by gastric outlet obstruction and resulted in revision to a Ruthie-en-Y.  He has had problems in the past with vomiting but this has been under control over the past few years with only few episodes per year.  He has also had SIBO and is on rotating antibiotic therapy chronically.  He had lost a significant amount of weight early on but it has increased and is now stable.  His appetite is good.  Bowel movements have been regular.  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  Labs last visit were essentially normal. Weight stable since 2024.    He has been taking the antibiotics continuously as opposed to waiting in between courses.  He has had no further episodes of vomiting and does have diarrhea if he waits too long between antibiotic courses.     Past Medical History  Medical History[1]    Surgical History  Surgical History[2]     Social History  He reports that he has quit smoking. His smoking use included cigarettes. He has never used smokeless tobacco. He reports that he does not currently use alcohol. He reports that he does not use drugs.    Family History  Family History[3]     Allergies  Patient has no known allergies.    Last Recorded Vitals  /77   Pulse 84   Temp 36.5 °C (97.7 °F)   Ht 1.778 m (5' 10\")   Wt 76.7 kg (169 lb)   BMI 24.25 kg/m²        Physical Exam  Vitals reviewed.   Constitutional:       Appearance: Normal appearance.   Cardiovascular:      Rate and Rhythm: Normal rate and regular rhythm.   Pulmonary:      Effort: Pulmonary effort is normal.      Breath sounds: Normal breath sounds.   Abdominal:      General: Bowel sounds are normal.      Palpations: Abdomen is soft.      Tenderness: There is no abdominal tenderness.   Neurological:      Mental Status: He is alert. "           Labs  Lab Results   Component Value Date    GLUCOSE 91 10/22/2024    CALCIUM 9.4 10/22/2024     10/22/2024    K 4.2 10/22/2024    CO2 24 10/22/2024     10/22/2024    BUN 16 10/22/2024    CREATININE 0.98 10/22/2024     Lab Results   Component Value Date    WBC 7.3 10/22/2024    HGB 13.9 10/22/2024    HCT 43.2 10/22/2024    MCV 95 10/22/2024     10/22/2024     10/22/2024    K 4.2 10/22/2024     10/22/2024    CO2 24 10/22/2024    BUN 16 10/22/2024    CREATININE 0.98 10/22/2024    CALCIUM 9.4 10/22/2024    PROT 7.1 10/22/2024    BILITOT 0.4 10/22/2024    ALKPHOS 78 10/22/2024    ALT 41 10/22/2024    AST 42 (H) 10/22/2024    GLUCOSE 91 10/22/2024           Imaging     No results found.  Electrocardiogram 12 Lead  Sinus bradycardia  Otherwise normal ECG  When compared with ECG of 21-SEP-2011 10:11,  No significant change was found  Confirmed by ANDI POLO MD ([id number]) on 12/18/2014 7:37:36 PM         ASSESSMENT & PLAN  Problem List Items Addressed This Visit           ICD-10-CM    Bacterial overgrowth syndrome - Primary K63.89    Relevant Medications    metroNIDAZOLE (Flagyl) 250 mg tablet    Other Relevant Orders    Follow Up In Gastroenterology    Peptic ulcer disease K27.9    Relevant Orders    Follow Up In Gastroenterology     Other Visit Diagnoses         Codes      Idiopathic chronic pancreatitis (Multi)     K86.1    Relevant Medications    Creon 12,000-38,000 -60,000 unit capsule          He is doing well on his current regimen.  I advised him to wait at least a week in between antibiotic courses.  He will follow-up with me in 6 months time.    I spent 15 minutes in the professional and overall care of this patient.      uGrpreet Abreu MD         [1]   Past Medical History:  Diagnosis Date    Other conditions influencing health status 08/25/2013    Digestive System Complications    Personal history of other diseases of the digestive system     Personal history of  gallstones   [2]   Past Surgical History:  Procedure Laterality Date    OTHER SURGICAL HISTORY  02/24/2014    Partial Gastrectomy Billroth II    OTHER SURGICAL HISTORY  02/24/2014    Proximal Subtotal Pancreatectomy (Whipple Procedure)   [3]   Family History  Problem Relation Name Age of Onset    No Known Problems Mother      No Known Problems Father